# Patient Record
Sex: FEMALE | Race: WHITE | HISPANIC OR LATINO | Employment: UNEMPLOYED | URBAN - METROPOLITAN AREA
[De-identification: names, ages, dates, MRNs, and addresses within clinical notes are randomized per-mention and may not be internally consistent; named-entity substitution may affect disease eponyms.]

---

## 2024-04-15 ENCOUNTER — OFFICE VISIT (OUTPATIENT)
Dept: FAMILY MEDICINE CLINIC | Facility: CLINIC | Age: 39
End: 2024-04-15
Payer: COMMERCIAL

## 2024-04-15 VITALS
DIASTOLIC BLOOD PRESSURE: 82 MMHG | TEMPERATURE: 98 F | WEIGHT: 217 LBS | BODY MASS INDEX: 40.97 KG/M2 | OXYGEN SATURATION: 98 % | HEART RATE: 83 BPM | HEIGHT: 61 IN | SYSTOLIC BLOOD PRESSURE: 122 MMHG

## 2024-04-15 DIAGNOSIS — R63.5 WEIGHT GAIN: ICD-10-CM

## 2024-04-15 DIAGNOSIS — R79.89 ELEVATED TSH: ICD-10-CM

## 2024-04-15 DIAGNOSIS — I10 PRIMARY HYPERTENSION: Primary | ICD-10-CM

## 2024-04-15 DIAGNOSIS — L65.9 HAIR LOSS: ICD-10-CM

## 2024-04-15 DIAGNOSIS — Z01.30 BLOOD PRESSURE CHECK: ICD-10-CM

## 2024-04-15 PROCEDURE — 3079F DIAST BP 80-89 MM HG: CPT

## 2024-04-15 PROCEDURE — 3074F SYST BP LT 130 MM HG: CPT

## 2024-04-15 PROCEDURE — 1159F MED LIST DOCD IN RCRD: CPT

## 2024-04-15 PROCEDURE — 99213 OFFICE O/P EST LOW 20 MIN: CPT

## 2024-04-15 PROCEDURE — 1160F RVW MEDS BY RX/DR IN RCRD: CPT

## 2024-04-15 NOTE — ASSESSMENT & PLAN NOTE
Hypertension is stable and controlled  Continue current treatment regimen.  Dietary sodium restriction.  Weight loss.  Regular aerobic exercise.  Ambulatory blood pressure monitoring.  Blood pressure will be reassessed  at next scheduled appointment .

## 2024-04-15 NOTE — PROGRESS NOTES
"Chief Complaint  Hypertension (Pt is here today for follow up on hypertension,)    Subjective        Marielena Couch presents to [facility]    History of Present Illness  38 year old female presents for hypertension. She is currently on Losartan 100mg and HCTZ 25mg daily. BP is well controlled in clinic today at 122/82. TSH 10.700 with normal T4 on prior lab testing. Reports weight gain and hair loss. Denies fatigue and mood changes. Will reassess today. Denies chest pain and shortness of breath.   Hypertension      Objective   Vital Signs:  /82   Pulse 83   Temp 98 °F (36.7 °C)   Ht 155 cm (61.02\")   Wt 98.4 kg (217 lb)   SpO2 98%   BMI 40.97 kg/m²   Estimated body mass index is 40.97 kg/m² as calculated from the following:    Height as of this encounter: 155 cm (61.02\").    Weight as of this encounter: 98.4 kg (217 lb).          Physical Exam  Constitutional:       Appearance: Normal appearance.   HENT:      Head: Normocephalic.   Eyes:      Conjunctiva/sclera: Conjunctivae normal.      Pupils: Pupils are equal, round, and reactive to light.   Cardiovascular:      Rate and Rhythm: Normal rate and regular rhythm.      Pulses: Normal pulses.      Heart sounds: Normal heart sounds.   Pulmonary:      Effort: Pulmonary effort is normal.      Breath sounds: Normal breath sounds.   Skin:     General: Skin is warm.   Neurological:      General: No focal deficit present.      Mental Status: She is alert and oriented to person, place, and time.   Psychiatric:         Mood and Affect: Mood normal.         Behavior: Behavior normal.            Result Review :    The following data was reviewed by: JULIANA Mgcee on 04/15/2024:  Common labs          4/1/2024    14:27   Common Labs   Glucose 89    BUN 8    Creatinine 0.69    Sodium 138    Potassium 4.4    Chloride 101    Calcium 10.0    Total Protein 8.2    Albumin 4.3    Total Bilirubin 0.3    Alkaline " Phosphatase 96    AST (SGOT) 23    ALT (SGPT) 26    WBC 10.3    Hemoglobin 14.9    Hematocrit 44.3    Platelets 411    Total Cholesterol 200    Triglycerides 273    HDL Cholesterol 28    LDL Cholesterol  123    Hemoglobin A1C 6.3    Uric Acid 4.8      Data reviewed : labs              Assessment and Plan     Diagnoses and all orders for this visit:    1. Primary hypertension (Primary)  Assessment & Plan:  Hypertension is stable and controlled  Continue current treatment regimen.  Dietary sodium restriction.  Weight loss.  Regular aerobic exercise.  Ambulatory blood pressure monitoring.  Blood pressure will be reassessed  at next scheduled appointment .      2. Blood pressure check  Comments:  Continue current BP medication and lifestyle regimen  Increase daily exercise, limit sodium and fried, fatty food intake    3. Elevated TSH  -     Thyroid Panel With TSH    4. Weight gain  -     Thyroid Panel With TSH    5. Hair loss  -     Thyroid Panel With TSH             Follow Up     Return in about 6 months (around 10/15/2024) for Annual physical.  Patient was given instructions and counseling regarding her condition or for health maintenance advice. Please see specific information pulled into the AVS if appropriate.

## 2024-04-16 LAB
FT4I SERPL CALC-MCNC: 1.4 (ref 1.2–4.9)
T3RU NFR SERPL: 28 % (ref 24–39)
T4 SERPL-MCNC: 5.1 UG/DL (ref 4.5–12)
TSH SERPL DL<=0.005 MIU/L-ACNC: 12 UIU/ML (ref 0.45–4.5)

## 2024-04-22 DIAGNOSIS — E03.8 SUBCLINICAL HYPOTHYROIDISM: Primary | ICD-10-CM

## 2024-04-22 RX ORDER — LEVOTHYROXINE SODIUM 0.03 MG/1
25 TABLET ORAL
Qty: 30 TABLET | Refills: 0 | Status: SHIPPED | OUTPATIENT
Start: 2024-04-22

## 2024-05-20 DIAGNOSIS — E03.8 SUBCLINICAL HYPOTHYROIDISM: ICD-10-CM

## 2024-05-21 RX ORDER — LEVOTHYROXINE SODIUM 0.03 MG/1
25 TABLET ORAL
Qty: 30 TABLET | Refills: 0 | Status: SHIPPED | OUTPATIENT
Start: 2024-05-21

## 2024-05-23 ENCOUNTER — OFFICE VISIT (OUTPATIENT)
Dept: FAMILY MEDICINE CLINIC | Facility: CLINIC | Age: 39
End: 2024-05-23
Payer: COMMERCIAL

## 2024-05-23 VITALS
SYSTOLIC BLOOD PRESSURE: 120 MMHG | BODY MASS INDEX: 41.54 KG/M2 | WEIGHT: 220 LBS | TEMPERATURE: 98 F | HEART RATE: 85 BPM | HEIGHT: 61 IN | DIASTOLIC BLOOD PRESSURE: 80 MMHG | OXYGEN SATURATION: 100 %

## 2024-05-23 DIAGNOSIS — E03.8 SUBCLINICAL HYPOTHYROIDISM: Primary | ICD-10-CM

## 2024-05-23 DIAGNOSIS — J00 ACUTE NASOPHARYNGITIS: ICD-10-CM

## 2024-05-23 DIAGNOSIS — E78.2 MIXED HYPERLIPIDEMIA: ICD-10-CM

## 2024-05-23 DIAGNOSIS — I10 PRIMARY HYPERTENSION: ICD-10-CM

## 2024-05-23 PROCEDURE — 1160F RVW MEDS BY RX/DR IN RCRD: CPT

## 2024-05-23 PROCEDURE — 3074F SYST BP LT 130 MM HG: CPT

## 2024-05-23 PROCEDURE — 99214 OFFICE O/P EST MOD 30 MIN: CPT

## 2024-05-23 PROCEDURE — 1159F MED LIST DOCD IN RCRD: CPT

## 2024-05-23 PROCEDURE — 1126F AMNT PAIN NOTED NONE PRSNT: CPT

## 2024-05-23 PROCEDURE — 3079F DIAST BP 80-89 MM HG: CPT

## 2024-05-23 RX ORDER — LORATADINE 10 MG/1
10 TABLET ORAL DAILY
Qty: 90 TABLET | Refills: 0 | Status: SHIPPED | OUTPATIENT
Start: 2024-05-23

## 2024-05-23 RX ORDER — AZITHROMYCIN 250 MG/1
TABLET, FILM COATED ORAL
Qty: 6 TABLET | Refills: 0 | Status: SHIPPED | OUTPATIENT
Start: 2024-05-23

## 2024-05-23 NOTE — PROGRESS NOTES
"Chief Complaint  Thyroid Problem (Pt is here today to follow up on thyroid.)    Subjective        Marielena Couch presents to [facility]    History of Present Illness  38 year old female presents for hypothyroidism. Interpretor used during exam. She is currently on levothyroxine 25 mcg daily. TSH 12.000 with normal T4 on 4/15. Hypertension is well controlled at 120/80. Continue daily Losartan and HCTZ. Denies chest pain and shortness of breath. She reports nasal congestion, rhinorrhea, and cough that is intermittent. Discussed most recent lipid panel results. Pt interested in ways to lower levels. Educated to follow healthy diet high in lean proteins such as chicken and fish. Have diet high in whole grains, fiber, fruits, and vegetables. Limit fried foods, red meats, and sugar. Pt should exercise 5 days/week.      Objective   Vital Signs:  /80   Pulse 85   Temp 98 °F (36.7 °C)   Ht 155 cm (61.02\")   Wt 99.8 kg (220 lb)   SpO2 100%   BMI 41.54 kg/m²   Estimated body mass index is 41.54 kg/m² as calculated from the following:    Height as of this encounter: 155 cm (61.02\").    Weight as of this encounter: 99.8 kg (220 lb).          Physical Exam  Constitutional:       Appearance: Normal appearance.   HENT:      Head: Normocephalic.   Eyes:      Conjunctiva/sclera: Conjunctivae normal.      Pupils: Pupils are equal, round, and reactive to light.   Cardiovascular:      Rate and Rhythm: Normal rate and regular rhythm.      Pulses: Normal pulses.      Heart sounds: Normal heart sounds.   Pulmonary:      Effort: Pulmonary effort is normal.      Breath sounds: Normal breath sounds.   Skin:     General: Skin is warm.   Neurological:      General: No focal deficit present.      Mental Status: She is alert and oriented to person, place, and time.   Psychiatric:         Mood and Affect: Mood normal.         Behavior: Behavior normal.            Result Review :    The " following data was reviewed by: JULIANA Mcgee on 05/23/2024:  Common labs          4/1/2024    14:27   Common Labs   Glucose 89    BUN 8    Creatinine 0.69    Sodium 138    Potassium 4.4    Chloride 101    Calcium 10.0    Total Protein 8.2    Albumin 4.3    Total Bilirubin 0.3    Alkaline Phosphatase 96    AST (SGOT) 23    ALT (SGPT) 26    WBC 10.3    Hemoglobin 14.9    Hematocrit 44.3    Platelets 411    Total Cholesterol 200    Triglycerides 273    HDL Cholesterol 28    LDL Cholesterol  123    Hemoglobin A1C 6.3    Uric Acid 4.8      Data reviewed : labs              Assessment and Plan     Diagnoses and all orders for this visit:    1. Subclinical hypothyroidism (Primary)  -     Thyroid Panel With TSH    2. Primary hypertension  Assessment & Plan:  Hypertension is stable and controlled  Continue current treatment regimen.  Ambulatory blood pressure monitoring.  Blood pressure will be reassessed  at next scheduled appt .      3. Acute nasopharyngitis  -     azithromycin (Zithromax Z-Frank) 250 MG tablet; Take 2 tablets by mouth on day 1, then 1 tablet daily on days 2-5  Dispense: 6 tablet; Refill: 0  -     loratadine (Claritin) 10 MG tablet; Take 1 tablet by mouth Daily.  Dispense: 90 tablet; Refill: 0    4. Mixed hyperlipidemia  Comments:  Educated to follow diet - limit red meats, fried foods  Increase intake of lean meats such as chicken, turkey, and fish  Exercise 5 days/week             Follow Up     Return in about 6 weeks (around 7/2/2024).  Patient was given instructions and counseling regarding her condition or for health maintenance advice. Please see specific information pulled into the AVS if appropriate.

## 2024-05-23 NOTE — ASSESSMENT & PLAN NOTE
Hypertension is stable and controlled  Continue current treatment regimen.  Ambulatory blood pressure monitoring.  Blood pressure will be reassessed  at next scheduled appt .

## 2024-05-24 LAB
FT4I SERPL CALC-MCNC: 1.5 (ref 1.2–4.9)
T3RU NFR SERPL: 26 % (ref 24–39)
T4 SERPL-MCNC: 5.9 UG/DL (ref 4.5–12)
TSH SERPL DL<=0.005 MIU/L-ACNC: 5.14 UIU/ML (ref 0.45–4.5)

## 2024-05-26 DIAGNOSIS — E03.8 SUBCLINICAL HYPOTHYROIDISM: ICD-10-CM

## 2024-05-28 RX ORDER — LEVOTHYROXINE SODIUM 0.03 MG/1
25 TABLET ORAL
Qty: 30 TABLET | Refills: 3 | Status: SHIPPED | OUTPATIENT
Start: 2024-05-28

## 2024-06-27 ENCOUNTER — OFFICE VISIT (OUTPATIENT)
Dept: OBSTETRICS AND GYNECOLOGY | Facility: CLINIC | Age: 39
End: 2024-06-27
Payer: COMMERCIAL

## 2024-06-27 VITALS — DIASTOLIC BLOOD PRESSURE: 81 MMHG | WEIGHT: 223 LBS | SYSTOLIC BLOOD PRESSURE: 113 MMHG | BODY MASS INDEX: 42.11 KG/M2

## 2024-06-27 DIAGNOSIS — Z01.419 PAP TEST, AS PART OF ROUTINE GYNECOLOGICAL EXAMINATION: ICD-10-CM

## 2024-06-27 DIAGNOSIS — M10.9 GOUT OF HAND, UNSPECIFIED CAUSE, UNSPECIFIED CHRONICITY, UNSPECIFIED LATERALITY: ICD-10-CM

## 2024-06-27 DIAGNOSIS — N91.2 AMENORRHEA: Primary | ICD-10-CM

## 2024-06-27 DIAGNOSIS — I10 PRIMARY HYPERTENSION: ICD-10-CM

## 2024-06-27 DIAGNOSIS — Z00.00 ANNUAL PHYSICAL EXAM: ICD-10-CM

## 2024-06-27 LAB — HCG INTACT+B SERPL-ACNC: <1 MIU/ML

## 2024-06-27 PROCEDURE — 99385 PREV VISIT NEW AGE 18-39: CPT | Performed by: OBSTETRICS & GYNECOLOGY

## 2024-06-27 PROCEDURE — 3074F SYST BP LT 130 MM HG: CPT | Performed by: OBSTETRICS & GYNECOLOGY

## 2024-06-27 PROCEDURE — 99213 OFFICE O/P EST LOW 20 MIN: CPT | Performed by: OBSTETRICS & GYNECOLOGY

## 2024-06-27 PROCEDURE — 1159F MED LIST DOCD IN RCRD: CPT | Performed by: OBSTETRICS & GYNECOLOGY

## 2024-06-27 PROCEDURE — 2014F MENTAL STATUS ASSESS: CPT | Performed by: OBSTETRICS & GYNECOLOGY

## 2024-06-27 PROCEDURE — 1160F RVW MEDS BY RX/DR IN RCRD: CPT | Performed by: OBSTETRICS & GYNECOLOGY

## 2024-06-27 PROCEDURE — 3079F DIAST BP 80-89 MM HG: CPT | Performed by: OBSTETRICS & GYNECOLOGY

## 2024-06-27 RX ORDER — ALLOPURINOL 100 MG/1
100 TABLET ORAL DAILY
Qty: 90 TABLET | Refills: 1 | Status: SHIPPED | OUTPATIENT
Start: 2024-06-27

## 2024-06-27 RX ORDER — HYDROCHLOROTHIAZIDE 25 MG/1
25 TABLET ORAL DAILY
Qty: 90 TABLET | Refills: 1 | Status: SHIPPED | OUTPATIENT
Start: 2024-06-27

## 2024-06-27 RX ORDER — LOSARTAN POTASSIUM 100 MG/1
100 TABLET ORAL DAILY
Qty: 90 TABLET | Refills: 1 | Status: SHIPPED | OUTPATIENT
Start: 2024-06-27

## 2024-06-27 NOTE — PROGRESS NOTES
[facility]   Marielena Couch  is a 38 y.o. female who presents for 2 reasons.  First, she would like to establish care and have a routine gynecologic exam and Pap smear.  Overall, she is feeling well.  Bowels and bladder are functioning normally.  The patient has a copper IUD which was placed in [place] more than 10 years ago.  This is her form of contraception.  Next, the patient has been amenorrheic for the last year.  She does not have hot flashes or night sweats.  Does not feel that she is likely to be pregnant.  Does not have headaches or vision changes.  She reports that her cycles have always been irregular but they usually occur every 3 to 4 months until this year.  She has been treated by her primary physician for hypothyroidism and her most recent TSH remains elevated.  The patient would like to evaluate her amenorrhea today as well.    Chief Complaint   Patient presents with    Annual Exam     New gyno   Over a year since cycle        Past Medical History:   Diagnosis Date    Hypertension        Past Surgical History:   Procedure Laterality Date    BREAST SURGERY      cosmetics       Social History     Socioeconomic History    Marital status: Single   Tobacco Use    Smoking status: Never    Smokeless tobacco: Never   Vaping Use    Vaping status: Never Used   Substance and Sexual Activity    Alcohol use: Yes     Comment: ocass    Drug use: Never       The following portions of the patient's history were reviewed and updated as appropriate: allergies, current medications, past family history, past medical history, past social history, past surgical history and problem list.    Review of Systems  This is positive for secondary amenorrhea.  It is negative for headaches or vision changes.  Negative for unexplained weight change.  Negative for fever or chills.  All other systems are reviewed and are negative.        Physical Exam  Vitals and nursing note reviewed.   Constitutional:       Appearance: She  is well-developed.   HENT:      Head: Normocephalic and atraumatic.   Cardiovascular:      Rate and Rhythm: Normal rate and regular rhythm.   Pulmonary:      Effort: Pulmonary effort is normal.      Breath sounds: Normal breath sounds. No wheezing or rales.   Chest:      Comments: Breast reduction incision sites are well-approximated.  There are no palpable breast lumps.  Nipple discharge and axillary adenopathy are absent.  Abdominal:      General: There is no distension.      Palpations: Abdomen is soft.      Tenderness: There is no abdominal tenderness.   Genitourinary:     Labia:         Right: No lesion.         Left: No lesion.       Vagina: Normal. No vaginal discharge.      Cervix: No cervical motion tenderness.      Adnexa:         Right: No mass or tenderness.          Left: No mass or tenderness.        Comments: IUD string is visible at the cervix.  It is a white string.  The uterus is mobile within the pelvis.  It is normal in size.  It is not tender to palpation.  Skin:     General: Skin is warm and dry.   Neurological:      Mental Status: She is alert and oriented to person, place, and time.         Assessment    Diagnoses and all orders for this visit:    1. Amenorrhea (Primary)  -     US Non-ob Transvaginal; Future  -     Follicle Stimulating Hormone  -     HCG, B-subunit, Quantitative    2. Pap test, as part of routine gynecological examination  -     IGP, Rfx Aptima HPV ASCU    3. Annual physical exam        Plan  Annual examination performed  Contraceptive counseling.  The patient reports that she has a copper IUD in place.  It was placed in [place] more than 10 years ago.  She is not certain how long her IUD can remain in place.  I counseled the patient that a copper IUD is indicated in [place] for 10 years and that we should consider either replacing the IUD or an alternative method of contraception.  I recommend that we defer this decision until the remainder of the patient's workup for  amenorrhea has been completed and she agrees.  Secondary amenorrhea.  We discussed possible causes of this.  The patient does not have hot flashes or night sweats, but early menopause is certainly a possibility.  I recommend a follicle-stimulating hormone.  Though the patient has an IUD in place, I also recommend an hCG and the patient agrees.  She does not have pregnancy related symptoms.  There is a high likelihood that the patient's hypothyroidism is the cause of her abnormal cycles.  She is working with her primary care physician to treat this and has an appointment in July for further management.  Nonetheless, there is a concern for endometrial hyperplasia or neoplasia.  I recommend an ultrasound to assess the endometrium and the adnexa.  Also, I recommend a Provera withdrawal.  The patient will follow-up in approximately 3 weeks to review the results of her blood tests and ultrasound.  We can then discuss further management of her amenorrhea as well as contraception.  Answered the patient's questions and she agrees with these recommendations.    Return in about 3 weeks (around 7/18/2024).    Social History     Tobacco Use   Smoking Status Never   Smokeless Tobacco Never

## 2024-06-28 DIAGNOSIS — M10.9 GOUT OF HAND, UNSPECIFIED CAUSE, UNSPECIFIED CHRONICITY, UNSPECIFIED LATERALITY: ICD-10-CM

## 2024-06-28 DIAGNOSIS — I10 PRIMARY HYPERTENSION: ICD-10-CM

## 2024-06-28 LAB — FSH SERPL-ACNC: 5.3 MIU/ML

## 2024-06-28 RX ORDER — LOSARTAN POTASSIUM 100 MG/1
100 TABLET ORAL DAILY
Qty: 90 TABLET | Refills: 1 | OUTPATIENT
Start: 2024-06-28

## 2024-06-28 RX ORDER — HYDROCHLOROTHIAZIDE 25 MG/1
25 TABLET ORAL DAILY
Qty: 90 TABLET | Refills: 1 | OUTPATIENT
Start: 2024-06-28

## 2024-06-28 RX ORDER — ALLOPURINOL 100 MG/1
100 TABLET ORAL DAILY
Qty: 90 TABLET | Refills: 1 | OUTPATIENT
Start: 2024-06-28

## 2024-07-01 LAB
CONV .: NORMAL
CYTOLOGIST CVX/VAG CYTO: NORMAL
CYTOLOGY CVX/VAG DOC CYTO: NORMAL
CYTOLOGY CVX/VAG DOC THIN PREP: NORMAL
DX ICD CODE: NORMAL
Lab: NORMAL
OTHER STN SPEC: NORMAL
STAT OF ADQ CVX/VAG CYTO-IMP: NORMAL

## 2024-07-02 ENCOUNTER — TELEPHONE (OUTPATIENT)
Dept: OBSTETRICS AND GYNECOLOGY | Facility: CLINIC | Age: 39
End: 2024-07-02
Payer: COMMERCIAL

## 2024-07-02 NOTE — TELEPHONE ENCOUNTER
Dr. Figueroa,    Patient is scheduled and is now asking if you were still planning on sending in the hormone medication that was discussed during her visit on 6/27?      Sheri

## 2024-07-02 NOTE — TELEPHONE ENCOUNTER
Dr. Figueroa,    Patient is calling and stating she was supposed to have an US by today 7/2. I see a US ordered but the office note states to return in 3 weeks. And the patient was just seen 6/27/24. Is that when she is supposed to complete the US is the 3 week f/u?    Please advise  Thanks Sheri

## 2024-07-02 NOTE — TELEPHONE ENCOUNTER
The main issue was to do the ultrasound after her lab results had come back.  It could be done as part of a follow-up visit next week or the week after but it is not necessary to do it today.  Thank you.

## 2024-07-08 ENCOUNTER — OFFICE VISIT (OUTPATIENT)
Dept: FAMILY MEDICINE CLINIC | Facility: CLINIC | Age: 39
End: 2024-07-08
Payer: COMMERCIAL

## 2024-07-08 VITALS
WEIGHT: 226 LBS | HEART RATE: 93 BPM | SYSTOLIC BLOOD PRESSURE: 120 MMHG | OXYGEN SATURATION: 97 % | BODY MASS INDEX: 42.67 KG/M2 | TEMPERATURE: 98 F | DIASTOLIC BLOOD PRESSURE: 80 MMHG | HEIGHT: 61 IN

## 2024-07-08 DIAGNOSIS — Z00.00 ROUTINE HEALTH MAINTENANCE: Primary | ICD-10-CM

## 2024-07-08 DIAGNOSIS — E03.8 SUBCLINICAL HYPOTHYROIDISM: ICD-10-CM

## 2024-07-08 DIAGNOSIS — Z76.89 ENCOUNTER FOR WEIGHT MANAGEMENT: ICD-10-CM

## 2024-07-08 DIAGNOSIS — R73.03 PREDIABETES: ICD-10-CM

## 2024-07-08 DIAGNOSIS — E78.2 MIXED HYPERLIPIDEMIA: ICD-10-CM

## 2024-07-08 DIAGNOSIS — I10 PRIMARY HYPERTENSION: ICD-10-CM

## 2024-07-08 PROCEDURE — 99214 OFFICE O/P EST MOD 30 MIN: CPT

## 2024-07-08 PROCEDURE — 1159F MED LIST DOCD IN RCRD: CPT

## 2024-07-08 PROCEDURE — 1126F AMNT PAIN NOTED NONE PRSNT: CPT

## 2024-07-08 PROCEDURE — 3079F DIAST BP 80-89 MM HG: CPT

## 2024-07-08 PROCEDURE — 1160F RVW MEDS BY RX/DR IN RCRD: CPT

## 2024-07-08 PROCEDURE — 3074F SYST BP LT 130 MM HG: CPT

## 2024-07-08 NOTE — PROGRESS NOTES
"Chief Complaint  Thyroid Problem (Pt is here today to follow up on thyroid no other cc.)    Subjective        Marielena Couch presents to [facility]    History of Present Illness  38 year old female presents for 6 month follow up. Interpretor used during exam. She is currently on levothyroxine 25mcg for hypothyroidism. Hypertension is well controlled at 120/80 with losartan and hctz. Labs drawn 4/1/24 show mixed hyperlipidemia and prediabetes. Will recheck labs today. She is inquiring about weight loss medication. Has BMI of 42.67. She reports she does not follow healthy diet or exercise. Pt should follow healthy diet high in lean proteins such as chicken and fish. Have diet high in whole grains, fiber, fruits, and vegetables. Limit fried foods, red meats, and sugar. Pt should exercise 3-4 days/week. Informed patient that if healthy habits are not established, it will be difficult to sustain weight loss once medication has been discontinued. Healthy diet information attached to AVS. Denies chest pain and shortness of breath.       Objective   Vital Signs:  /80   Pulse 93   Temp 98 °F (36.7 °C)   Ht 155 cm (61.02\")   Wt 103 kg (226 lb)   SpO2 97%   BMI 42.67 kg/m²   Estimated body mass index is 42.67 kg/m² as calculated from the following:    Height as of this encounter: 155 cm (61.02\").    Weight as of this encounter: 103 kg (226 lb).           Physical Exam  Constitutional:       Appearance: Normal appearance.   HENT:      Head: Normocephalic.   Eyes:      Conjunctiva/sclera: Conjunctivae normal.      Pupils: Pupils are equal, round, and reactive to light.   Cardiovascular:      Rate and Rhythm: Normal rate and regular rhythm.      Pulses: Normal pulses.      Heart sounds: Normal heart sounds.   Pulmonary:      Effort: Pulmonary effort is normal.      Breath sounds: Normal breath sounds.   Skin:     General: Skin is warm.   Neurological:      General: No " focal deficit present.      Mental Status: She is alert and oriented to person, place, and time.   Psychiatric:         Mood and Affect: Mood normal.         Behavior: Behavior normal.          Result Review :    The following data was reviewed by: JULIANA Mcgee on 07/08/2024:  Common labs          4/1/2024    14:27   Common Labs   Glucose 89    BUN 8    Creatinine 0.69    Sodium 138    Potassium 4.4    Chloride 101    Calcium 10.0    Total Protein 8.2    Albumin 4.3    Total Bilirubin 0.3    Alkaline Phosphatase 96    AST (SGOT) 23    ALT (SGPT) 26    WBC 10.3    Hemoglobin 14.9    Hematocrit 44.3    Platelets 411    Total Cholesterol 200    Triglycerides 273    HDL Cholesterol 28    LDL Cholesterol  123    Hemoglobin A1C 6.3    Uric Acid 4.8      Data reviewed : labs              Assessment and Plan     Diagnoses and all orders for this visit:    1. Routine health maintenance (Primary)    2. Primary hypertension  Assessment & Plan:  Hypertension is stable and controlled  Continue current treatment regimen.  Dietary sodium restriction.  Weight loss.  Regular aerobic exercise.  Ambulatory blood pressure monitoring.  Blood pressure will be reassessed  at next scheduled appt .    Orders:  -     Comprehensive metabolic panel    3. Subclinical hypothyroidism  -     Thyroid Panel With TSH    4. Prediabetes  -     Comprehensive metabolic panel  -     Hemoglobin A1c    5. Mixed hyperlipidemia  -     Comprehensive metabolic panel  -     Lipid panel    6. Encounter for weight management  -     Comprehensive metabolic panel  -     Thyroid Panel With TSH  -     Hemoglobin A1c    7. Body mass index (BMI) of 40.0 to 44.9 in adult  -     Comprehensive metabolic panel  -     Thyroid Panel With TSH  -     Hemoglobin A1c             Follow Up     No follow-ups on file.  Patient was given instructions and counseling regarding her condition or for health maintenance advice. Please see specific information pulled into the AVS if  appropriate.

## 2024-07-08 NOTE — ASSESSMENT & PLAN NOTE
Hypertension is stable and controlled  Continue current treatment regimen.  Dietary sodium restriction.  Weight loss.  Regular aerobic exercise.  Ambulatory blood pressure monitoring.  Blood pressure will be reassessed  at next scheduled appt .

## 2024-07-09 ENCOUNTER — PATIENT MESSAGE (OUTPATIENT)
Dept: OBSTETRICS AND GYNECOLOGY | Facility: CLINIC | Age: 39
End: 2024-07-09
Payer: COMMERCIAL

## 2024-07-09 ENCOUNTER — PATIENT ROUNDING (BHMG ONLY) (OUTPATIENT)
Dept: OBSTETRICS AND GYNECOLOGY | Facility: CLINIC | Age: 39
End: 2024-07-09
Payer: COMMERCIAL

## 2024-07-09 LAB
ALBUMIN SERPL-MCNC: 4.4 G/DL (ref 3.9–4.9)
ALP SERPL-CCNC: 101 IU/L (ref 44–121)
ALT SERPL-CCNC: 39 IU/L (ref 0–32)
AST SERPL-CCNC: 27 IU/L (ref 0–40)
BILIRUB SERPL-MCNC: 0.4 MG/DL (ref 0–1.2)
BUN SERPL-MCNC: 15 MG/DL (ref 6–20)
BUN/CREAT SERPL: 24 (ref 9–23)
CALCIUM SERPL-MCNC: 10.1 MG/DL (ref 8.7–10.2)
CHLORIDE SERPL-SCNC: 95 MMOL/L (ref 96–106)
CHOLEST SERPL-MCNC: 188 MG/DL (ref 100–199)
CO2 SERPL-SCNC: 26 MMOL/L (ref 20–29)
CREAT SERPL-MCNC: 0.63 MG/DL (ref 0.57–1)
EGFRCR SERPLBLD CKD-EPI 2021: 116 ML/MIN/1.73
FT4I SERPL CALC-MCNC: 1.8 (ref 1.2–4.9)
GLOBULIN SER CALC-MCNC: 4 G/DL (ref 1.5–4.5)
GLUCOSE SERPL-MCNC: 137 MG/DL (ref 70–99)
HBA1C MFR BLD: 6.5 % (ref 4.8–5.6)
HDLC SERPL-MCNC: 30 MG/DL
LDLC SERPL CALC-MCNC: 92 MG/DL (ref 0–99)
POTASSIUM SERPL-SCNC: 4.3 MMOL/L (ref 3.5–5.2)
PROT SERPL-MCNC: 8.4 G/DL (ref 6–8.5)
SODIUM SERPL-SCNC: 136 MMOL/L (ref 134–144)
T3RU NFR SERPL: 26 % (ref 24–39)
T4 SERPL-MCNC: 6.9 UG/DL (ref 4.5–12)
TRIGL SERPL-MCNC: 395 MG/DL (ref 0–149)
TSH SERPL DL<=0.005 MIU/L-ACNC: 5.15 UIU/ML (ref 0.45–4.5)
VLDLC SERPL CALC-MCNC: 66 MG/DL (ref 5–40)

## 2024-07-09 NOTE — PROGRESS NOTES
My chart message has been sent to the patient for PATIENT ROUNDING with [facility].

## 2024-07-11 DIAGNOSIS — E03.8 SUBCLINICAL HYPOTHYROIDISM: ICD-10-CM

## 2024-07-25 ENCOUNTER — OFFICE VISIT (OUTPATIENT)
Dept: OBSTETRICS AND GYNECOLOGY | Facility: CLINIC | Age: 39
End: 2024-07-25
Payer: COMMERCIAL

## 2024-07-25 VITALS — WEIGHT: 221 LBS | BODY MASS INDEX: 41.73 KG/M2 | DIASTOLIC BLOOD PRESSURE: 82 MMHG | SYSTOLIC BLOOD PRESSURE: 122 MMHG

## 2024-07-25 DIAGNOSIS — Z30.09 BIRTH CONTROL COUNSELING: ICD-10-CM

## 2024-07-25 DIAGNOSIS — N91.2 AMENORRHEA: Primary | ICD-10-CM

## 2024-07-25 RX ORDER — MEDROXYPROGESTERONE ACETATE 10 MG/1
10 TABLET ORAL DAILY
Qty: 10 TABLET | Refills: 0 | Status: SHIPPED | OUTPATIENT
Start: 2024-07-25 | End: 2024-08-04

## 2024-07-25 NOTE — PROGRESS NOTES
[facility]   Marielena Couch  is a 38 y.o. female who presents for 2 reasons.  First, she like to discuss secondary amenorrhea.  She had a workup including thyroid panel, pregnancy test and follicle-stimulating hormone.  Also, an ultrasound was performed.  The patient did not receive her Provera withdrawal that was ordered so this has not yet been done.  She has been evaluated by her primary care physician and is in the middle of a workup for her thyroid.  Next, the patient has an IUD that was placed in [place].  She would like to discuss removing it and having a new IUD placed.    Chief Complaint   Patient presents with    Follow-up       Past Medical History:   Diagnosis Date    Hypertension        Past Surgical History:   Procedure Laterality Date    BREAST SURGERY      cosmetics       Social History     Socioeconomic History    Marital status: Single   Tobacco Use    Smoking status: Never    Smokeless tobacco: Never   Vaping Use    Vaping status: Never Used   Substance and Sexual Activity    Alcohol use: Yes     Comment: ocass    Drug use: Never       The following portions of the patient's history were reviewed and updated as appropriate: allergies, current medications, past family history, past medical history, past social history, past surgical history and problem list.    Review of Systems     This is positive for amenorrhea.  Negative for hot flashes and night sweats.  Negative for unexplained weight change.    Physical Exam  Vitals and nursing note reviewed.   Constitutional:       Appearance: Normal appearance.   Neurological:      Mental Status: She is alert and oriented to person, place, and time.         Assessment    Diagnoses and all orders for this visit:    1. Amenorrhea (Primary)    2. Birth control counseling    Other orders  -     medroxyPROGESTERone (Provera) 10 MG tablet; Take 1 tablet by mouth Daily for 10 days.  Dispense: 10 tablet; Refill: 0        Plan  Secondary amenorrhea.  I  counseled the patient and answered her questions.  She is not menopausal by laboratory evaluation and as expected, she is not pregnant.  Most likely, she is anovulatory due to her hypothyroidism.  I expect this to improve as her thyroid workup is completed and treatment is instituted.  In the interim, I recommend a Provera withdrawal to stimulate a menstrual cycle.  The patient agrees with this.  Provera has been reordered.  Counseled regarding the patient's form of contraception.  She would like to have her IUD removed.  It is more than 10 years old.  She would like to have a ParaGard placed.  We discussed this procedure as well as its benefits, risks and alternatives.  I answered the patient's questions and she would like to proceed.  She will schedule this at checkout today.    No follow-ups on file.    Social History     Tobacco Use   Smoking Status Never   Smokeless Tobacco Never

## 2024-08-01 RX ORDER — MEDROXYPROGESTERONE ACETATE 10 MG/1
10 TABLET ORAL DAILY
Qty: 10 TABLET | Refills: 0 | OUTPATIENT
Start: 2024-08-01 | End: 2024-08-11

## 2024-08-21 DIAGNOSIS — E03.8 SUBCLINICAL HYPOTHYROIDISM: ICD-10-CM

## 2024-08-21 RX ORDER — LEVOTHYROXINE SODIUM 0.03 MG/1
25 TABLET ORAL
Qty: 30 TABLET | Refills: 3 | Status: SHIPPED | OUTPATIENT
Start: 2024-08-21

## 2024-09-13 ENCOUNTER — OFFICE VISIT (OUTPATIENT)
Dept: FAMILY MEDICINE CLINIC | Facility: CLINIC | Age: 39
End: 2024-09-13
Payer: COMMERCIAL

## 2024-09-13 VITALS
HEART RATE: 77 BPM | OXYGEN SATURATION: 97 % | BODY MASS INDEX: 41.72 KG/M2 | DIASTOLIC BLOOD PRESSURE: 80 MMHG | SYSTOLIC BLOOD PRESSURE: 114 MMHG | WEIGHT: 221 LBS | TEMPERATURE: 98 F | HEIGHT: 61 IN

## 2024-09-13 DIAGNOSIS — E03.8 SUBCLINICAL HYPOTHYROIDISM: ICD-10-CM

## 2024-09-13 DIAGNOSIS — Z11.1 SCREENING FOR TUBERCULOSIS: ICD-10-CM

## 2024-09-13 DIAGNOSIS — I10 PRIMARY HYPERTENSION: ICD-10-CM

## 2024-09-13 DIAGNOSIS — Z02.89 ENCOUNTER FOR PHYSICAL EXAMINATION RELATED TO EMPLOYMENT: Primary | ICD-10-CM

## 2024-09-13 PROCEDURE — 3074F SYST BP LT 130 MM HG: CPT

## 2024-09-13 PROCEDURE — 1126F AMNT PAIN NOTED NONE PRSNT: CPT

## 2024-09-13 PROCEDURE — 99214 OFFICE O/P EST MOD 30 MIN: CPT

## 2024-09-13 PROCEDURE — 1159F MED LIST DOCD IN RCRD: CPT

## 2024-09-13 PROCEDURE — 1160F RVW MEDS BY RX/DR IN RCRD: CPT

## 2024-09-13 PROCEDURE — 3079F DIAST BP 80-89 MM HG: CPT

## 2024-09-13 NOTE — PROGRESS NOTES
"Chief Complaint  Hypertension (Need form filled out for [employer] /Had physical in July/)    Subjective        Marielena Couch presents to [facility]  History of Present Illness  38 year old female presents for [facility] employment paperwork. Hypertension is well controlled at 114/80. Continue daily regimen of losartan and hctz. Monitor home BP and report continuous readings of 140/90 or higher. Follow heart healthy diet limiting sodium, red meat, and fried, fatty foods. Denies chest pain and shortness of breath. Will screen for TB for compliance. She is currently on levothyroxine 25mcg. Will assess thyroid panel today due to previous abnormal result. [place] [employer] Medical Examination of [employer] Employees returned back to patient. Will upload copy to chart.     Intreprtor used during exam, provided by [facility] [place].       Objective   Vital Signs:  /80 (BP Location: Left arm, Patient Position: Sitting, Cuff Size: Adult)   Pulse 77   Temp 98 °F (36.7 °C)   Ht 155 cm (61.02\")   Wt 100 kg (221 lb)   SpO2 97%   BMI 41.72 kg/m²   Estimated body mass index is 41.72 kg/m² as calculated from the following:    Height as of this encounter: 155 cm (61.02\").    Weight as of this encounter: 100 kg (221 lb).            Physical Exam  Constitutional:       Appearance: Normal appearance.   HENT:      Head: Normocephalic.   Eyes:      Conjunctiva/sclera: Conjunctivae normal.      Pupils: Pupils are equal, round, and reactive to light.   Cardiovascular:      Rate and Rhythm: Normal rate and regular rhythm.      Pulses: Normal pulses.      Heart sounds: Normal heart sounds.   Pulmonary:      Effort: Pulmonary effort is normal.      Breath sounds: Normal breath sounds.   Skin:     General: Skin is warm.   Neurological:      General: No focal deficit present.      Mental Status: She is alert and oriented to person, place, and time.   Psychiatric:         Mood and Affect: Mood " normal.         Behavior: Behavior normal.            Result Review :  The following data was reviewed by: JULIANA Mcgee on 09/13/2024:  Common labs          4/1/2024    14:27 7/8/2024    13:54   Common Labs   Glucose 89  137    BUN 8  15    Creatinine 0.69  0.63    Sodium 138  136    Potassium 4.4  4.3    Chloride 101  95    Calcium 10.0  10.1    Total Protein 8.2  8.4    Albumin 4.3  4.4    Total Bilirubin 0.3  0.4    Alkaline Phosphatase 96  101    AST (SGOT) 23  27    ALT (SGPT) 26  39    WBC 10.3     Hemoglobin 14.9     Hematocrit 44.3     Platelets 411     Total Cholesterol 200  188    Triglycerides 273  395    HDL Cholesterol 28  30    LDL Cholesterol  123  92    Hemoglobin A1C 6.3  6.5    Uric Acid 4.8       Data reviewed : labs            Assessment and Plan   Diagnoses and all orders for this visit:    1. Encounter for physical examination related to employment (Primary)    2. Primary hypertension  Assessment & Plan:  Hypertension is stable and controlled  Continue current treatment regimen.  Dietary sodium restriction.  Weight loss.  Regular aerobic exercise.  Ambulatory blood pressure monitoring.  Blood pressure will be reassessed  at next scheduled appt .      3. Subclinical hypothyroidism  -     Thyroid Panel With TSH    4. Screening for tuberculosis  -     QuantiFERON TB Gold             Follow Up   No follow-ups on file.  Patient was given instructions and counseling regarding her condition or for health maintenance advice. Please see specific information pulled into the AVS if appropriate.

## 2024-09-17 LAB
FT4I SERPL CALC-MCNC: 2.1 (ref 1.2–4.9)
GAMMA INTERFERON BACKGROUND BLD IA-ACNC: 0.04 IU/ML
M TB IFN-G BLD-IMP: NEGATIVE
M TB IFN-G CD4+ BCKGRND COR BLD-ACNC: 0.05 IU/ML
M TB IFN-G CD4+CD8+ BCKGRND COR BLD-ACNC: 0.05 IU/ML
MITOGEN IGNF BCKGRD COR BLD-ACNC: >10 IU/ML
QUANTIFERON INCUBATION: NORMAL
SERVICE CMNT-IMP: NORMAL
T3RU NFR SERPL: 29 % (ref 24–39)
T4 SERPL-MCNC: 7.2 UG/DL (ref 4.5–12)
TSH SERPL DL<=0.005 MIU/L-ACNC: 1.84 UIU/ML (ref 0.45–4.5)

## 2024-09-19 ENCOUNTER — TELEPHONE (OUTPATIENT)
Dept: FAMILY MEDICINE CLINIC | Facility: CLINIC | Age: 39
End: 2024-09-19
Payer: COMMERCIAL

## 2024-12-21 DIAGNOSIS — I10 PRIMARY HYPERTENSION: ICD-10-CM

## 2024-12-21 DIAGNOSIS — M10.9 GOUT OF HAND, UNSPECIFIED CAUSE, UNSPECIFIED CHRONICITY, UNSPECIFIED LATERALITY: ICD-10-CM

## 2024-12-23 RX ORDER — LOSARTAN POTASSIUM 100 MG/1
100 TABLET ORAL DAILY
Qty: 90 TABLET | Refills: 1 | Status: SHIPPED | OUTPATIENT
Start: 2024-12-23

## 2024-12-23 RX ORDER — ALLOPURINOL 100 MG/1
100 TABLET ORAL DAILY
Qty: 90 TABLET | Refills: 1 | Status: SHIPPED | OUTPATIENT
Start: 2024-12-23

## 2024-12-23 RX ORDER — HYDROCHLOROTHIAZIDE 25 MG/1
25 TABLET ORAL DAILY
Qty: 90 TABLET | Refills: 1 | Status: SHIPPED | OUTPATIENT
Start: 2024-12-23

## 2024-12-30 DIAGNOSIS — E03.8 SUBCLINICAL HYPOTHYROIDISM: ICD-10-CM

## 2024-12-30 RX ORDER — LEVOTHYROXINE SODIUM 25 UG/1
25 TABLET ORAL
Qty: 30 TABLET | Refills: 3 | Status: SHIPPED | OUTPATIENT
Start: 2024-12-30

## 2024-12-31 ENCOUNTER — OFFICE VISIT (OUTPATIENT)
Dept: FAMILY MEDICINE CLINIC | Facility: CLINIC | Age: 39
End: 2024-12-31
Payer: COMMERCIAL

## 2024-12-31 VITALS
OXYGEN SATURATION: 97 % | HEART RATE: 85 BPM | SYSTOLIC BLOOD PRESSURE: 126 MMHG | TEMPERATURE: 97.7 F | WEIGHT: 227.2 LBS | HEIGHT: 61 IN | BODY MASS INDEX: 42.9 KG/M2 | DIASTOLIC BLOOD PRESSURE: 84 MMHG

## 2024-12-31 DIAGNOSIS — Z80.3 FAMILY HISTORY OF BREAST CANCER: ICD-10-CM

## 2024-12-31 DIAGNOSIS — J30.2 SEASONAL ALLERGIC RHINITIS, UNSPECIFIED TRIGGER: ICD-10-CM

## 2024-12-31 DIAGNOSIS — Z12.31 ENCOUNTER FOR SCREENING MAMMOGRAM FOR MALIGNANT NEOPLASM OF BREAST: ICD-10-CM

## 2024-12-31 DIAGNOSIS — I10 PRIMARY HYPERTENSION: ICD-10-CM

## 2024-12-31 DIAGNOSIS — E78.2 MIXED HYPERLIPIDEMIA: ICD-10-CM

## 2024-12-31 DIAGNOSIS — R73.03 PREDIABETES: ICD-10-CM

## 2024-12-31 DIAGNOSIS — E03.8 SUBCLINICAL HYPOTHYROIDISM: ICD-10-CM

## 2024-12-31 DIAGNOSIS — Z00.00 ENCOUNTER FOR ANNUAL PHYSICAL EXAM: Primary | ICD-10-CM

## 2024-12-31 PROCEDURE — 3079F DIAST BP 80-89 MM HG: CPT

## 2024-12-31 PROCEDURE — 3074F SYST BP LT 130 MM HG: CPT

## 2024-12-31 PROCEDURE — 1160F RVW MEDS BY RX/DR IN RCRD: CPT

## 2024-12-31 PROCEDURE — 99395 PREV VISIT EST AGE 18-39: CPT

## 2024-12-31 PROCEDURE — 2014F MENTAL STATUS ASSESS: CPT

## 2024-12-31 PROCEDURE — 1126F AMNT PAIN NOTED NONE PRSNT: CPT

## 2024-12-31 PROCEDURE — 1159F MED LIST DOCD IN RCRD: CPT

## 2024-12-31 RX ORDER — FLUTICASONE PROPIONATE 50 MCG
2 SPRAY, SUSPENSION (ML) NASAL DAILY
Qty: 11.1 G | Refills: 1 | Status: SHIPPED | OUTPATIENT
Start: 2024-12-31

## 2024-12-31 RX ORDER — LORATADINE 10 MG/1
10 TABLET ORAL DAILY
Qty: 90 TABLET | Refills: 0 | Status: SHIPPED | OUTPATIENT
Start: 2024-12-31

## 2024-12-31 NOTE — PROGRESS NOTES
Chief Complaint  Annual Exam    Subjective            Marielena Couch presents to [facility]    History of Present Illness  39 year old female presents for annual physical. Pt is currently taking losartan and HCTZ for hypertension that is well controlled at 126/84. Taking levothyroxine 25mcg for hypothyroidism. Previous Hemoglobin A1c at 6.5% indicating diabetes. Lipid panel showed elevated triglyceride levels. Due for screening today. Pt is interested in mammogram to screen for breast cancer. She has family history of breast cancer in maternal aunt. Denies nipple discharge, reports episodes of right breast pain. She reports rhinorrhea with allergy symptoms. Encouraged daily allergy medication and use of flonase as needed. Denies chest pain and shortness of breath. Denies changes in bowel or bladder habits.     Interpretor used during exam, provided by [facility] [place].       CPE- Patient reporting that health is doing well overall.  Patient is here today for annual physical.  Eating a balanced diet.    Labs: Due for routine labs    Colorectal cancer screening: N/A  Breast cancer screening: N/A  Cervical cancer screening: due 6/27/27  Immunization History   Administered Date(s) Administered    Influenza, Unspecified 11/06/2024        Patient has seen dentist within last 6 months  no  Patient has seen eye specialist within last 6 months no    PHQ-2 Depression Screening  Little interest or pleasure in doing things?     Feeling down, depressed, or hopeless?     PHQ-2 Total Score        Social History     Socioeconomic History    Marital status: Single   Tobacco Use    Smoking status: Never    Smokeless tobacco: Never   Vaping Use    Vaping status: Never Used   Substance and Sexual Activity    Alcohol use: Yes     Comment: ocass    Drug use: Never          Objective   Vital Signs:   /84 (BP Location: Left arm, Patient Position: Sitting, Cuff Size: Adult)   Pulse 85   Temp  "97.7 °F (36.5 °C) (Infrared)   Ht 155 cm (61.02\")   Wt 103 kg (227 lb 3.2 oz)   SpO2 97%   BMI 42.90 kg/m²         Physical Exam  Constitutional:       Appearance: Normal appearance.   HENT:      Head: Normocephalic.      Right Ear: Tympanic membrane, ear canal and external ear normal.      Left Ear: Tympanic membrane, ear canal and external ear normal.      Nose: Nose normal. Rhinorrhea present.      Mouth/Throat:      Mouth: Mucous membranes are moist.      Pharynx: Oropharynx is clear.   Eyes:      Conjunctiva/sclera: Conjunctivae normal.      Pupils: Pupils are equal, round, and reactive to light.   Cardiovascular:      Rate and Rhythm: Normal rate and regular rhythm.      Pulses: Normal pulses.      Heart sounds: Normal heart sounds.   Pulmonary:      Effort: Pulmonary effort is normal.      Breath sounds: Normal breath sounds. No wheezing.   Abdominal:      General: Abdomen is flat. Bowel sounds are normal.      Palpations: Abdomen is soft.      Tenderness: There is no abdominal tenderness.   Musculoskeletal:         General: Normal range of motion.      Cervical back: Normal range of motion.   Skin:     General: Skin is warm.   Neurological:      General: No focal deficit present.      Mental Status: She is alert and oriented to person, place, and time.   Psychiatric:         Mood and Affect: Mood normal.         Behavior: Behavior normal.        Result Review :   The following data was reviewed by: JULIANA Mcgee on 12/31/2024:  Common labs          4/1/2024    14:27 7/8/2024    13:54   Common Labs   Glucose 89  137    BUN 8  15    Creatinine 0.69  0.63    Sodium 138  136    Potassium 4.4  4.3    Chloride 101  95    Calcium 10.0  10.1    Total Protein 8.2  8.4    Albumin 4.3  4.4    Total Bilirubin 0.3  0.4    Alkaline Phosphatase 96  101    AST (SGOT) 23  27    ALT (SGPT) 26  39    WBC 10.3     Hemoglobin 14.9     Hematocrit 44.3     Platelets 411     Total Cholesterol 200  188    Triglycerides " 273  395    HDL Cholesterol 28  30    LDL Cholesterol  123  92    Hemoglobin A1C 6.3  6.5    Uric Acid 4.8       Data reviewed : labs             Current Outpatient Medications:     allopurinol (ZYLOPRIM) 100 MG tablet, TAKE 1 TABLET BY MOUTH DAILY, Disp: 90 tablet, Rfl: 1    hydroCHLOROthiazide 25 MG tablet, TAKE 1 TABLET BY MOUTH DAILY, Disp: 90 tablet, Rfl: 1    levothyroxine (SYNTHROID, LEVOTHROID) 25 MCG tablet, TAKE 1 TABLET BY MOUTH EVERY MORNING, Disp: 30 tablet, Rfl: 3    losartan (COZAAR) 100 MG tablet, TAKE 1 TABLET BY MOUTH DAILY, Disp: 90 tablet, Rfl: 1    fluticasone (FLONASE) 50 MCG/ACT nasal spray, Administer 2 sprays into the nostril(s) as directed by provider Daily., Disp: 11.1 g, Rfl: 1    loratadine (Claritin) 10 MG tablet, Take 1 tablet by mouth Daily., Disp: 90 tablet, Rfl: 0          Assessment and Plan    Diagnoses and all orders for this visit:    1. Encounter for annual physical exam (Primary)  -     CBC w AUTO Differential  -     Comprehensive metabolic panel  -     Urinalysis With Culture If Indicated - Urine, Clean Catch    2. Primary hypertension  Assessment & Plan:  Hypertension is stable and controlled  Continue current treatment regimen.  Dietary sodium restriction.  Weight loss.  Regular aerobic exercise.  Ambulatory blood pressure monitoring.  Blood pressure will be reassessed in 6 months.    Orders:  -     CBC w AUTO Differential  -     Comprehensive metabolic panel    3. Mixed hyperlipidemia  -     CBC w AUTO Differential  -     Comprehensive metabolic panel  -     Lipid panel    4. Subclinical hypothyroidism  -     Thyroid Panel With TSH    5. Prediabetes  -     CBC w AUTO Differential  -     Comprehensive metabolic panel  -     Hemoglobin A1c    6. Encounter for screening mammogram for malignant neoplasm of breast  -     Mammo screening digital tomosynthesis bilateral w CAD; Future    7. Family history of breast cancer  -     Mammo screening digital tomosynthesis bilateral w  CAD; Future    8. Seasonal allergic rhinitis, unspecified trigger  -     loratadine (Claritin) 10 MG tablet; Take 1 tablet by mouth Daily.  Dispense: 90 tablet; Refill: 0  -     fluticasone (FLONASE) 50 MCG/ACT nasal spray; Administer 2 sprays into the nostril(s) as directed by provider Daily.  Dispense: 11.1 g; Refill: 1          The patient was counseled regarding nutrition, physical activity, healthy weight, injury prevention, misuse of tobacco, alcohol and illicit drugs, mental health, immunizations, and screenings.      Follow Up   Return in about 6 months (around 6/30/2025) for Next scheduled follow up.  Patient was given instructions and counseling regarding her condition or for health maintenance advice. Please see specific information pulled into the AVS if appropriate.

## 2025-01-01 LAB
ALBUMIN SERPL-MCNC: 4.1 G/DL (ref 3.5–5.2)
ALBUMIN/GLOB SERPL: 1.1 G/DL
ALP SERPL-CCNC: 99 U/L (ref 39–117)
ALT SERPL-CCNC: 29 U/L (ref 1–33)
AST SERPL-CCNC: 21 U/L (ref 1–32)
BASOPHILS # BLD AUTO: 0.04 10*3/MM3 (ref 0–0.2)
BASOPHILS NFR BLD AUTO: 0.5 % (ref 0–1.5)
BILIRUB SERPL-MCNC: 0.5 MG/DL (ref 0–1.2)
BUN SERPL-MCNC: 12 MG/DL (ref 6–20)
BUN/CREAT SERPL: 15.2 (ref 7–25)
CALCIUM SERPL-MCNC: 9.1 MG/DL (ref 8.6–10.5)
CHLORIDE SERPL-SCNC: 101 MMOL/L (ref 98–107)
CHOLEST SERPL-MCNC: 126 MG/DL (ref 0–200)
CO2 SERPL-SCNC: 25.9 MMOL/L (ref 22–29)
CREAT SERPL-MCNC: 0.79 MG/DL (ref 0.57–1)
EGFRCR SERPLBLD CKD-EPI 2021: 97.7 ML/MIN/1.73
EOSINOPHIL # BLD AUTO: 0.15 10*3/MM3 (ref 0–0.4)
EOSINOPHIL NFR BLD AUTO: 1.7 % (ref 0.3–6.2)
ERYTHROCYTE [DISTWIDTH] IN BLOOD BY AUTOMATED COUNT: 13.1 % (ref 12.3–15.4)
FT4I SERPL CALC-MCNC: 1.2 (ref 1.2–4.9)
GLOBULIN SER CALC-MCNC: 3.7 GM/DL
GLUCOSE SERPL-MCNC: 104 MG/DL (ref 65–99)
HBA1C MFR BLD: 6.1 % (ref 4.8–5.6)
HCT VFR BLD AUTO: 45.7 % (ref 34–46.6)
HDLC SERPL-MCNC: 22 MG/DL (ref 40–60)
HGB BLD-MCNC: 15.2 G/DL (ref 12–15.9)
IMM GRANULOCYTES # BLD AUTO: 0.03 10*3/MM3 (ref 0–0.05)
IMM GRANULOCYTES NFR BLD AUTO: 0.3 % (ref 0–0.5)
LDLC SERPL CALC-MCNC: 75 MG/DL (ref 0–100)
LYMPHOCYTES # BLD AUTO: 2.57 10*3/MM3 (ref 0.7–3.1)
LYMPHOCYTES NFR BLD AUTO: 29.7 % (ref 19.6–45.3)
MCH RBC QN AUTO: 28.7 PG (ref 26.6–33)
MCHC RBC AUTO-ENTMCNC: 33.3 G/DL (ref 31.5–35.7)
MCV RBC AUTO: 86.2 FL (ref 79–97)
MONOCYTES # BLD AUTO: 0.61 10*3/MM3 (ref 0.1–0.9)
MONOCYTES NFR BLD AUTO: 7.1 % (ref 5–12)
NEUTROPHILS # BLD AUTO: 5.24 10*3/MM3 (ref 1.7–7)
NEUTROPHILS NFR BLD AUTO: 60.7 % (ref 42.7–76)
NRBC BLD AUTO-RTO: 0 /100 WBC (ref 0–0.2)
PLATELET # BLD AUTO: 311 10*3/MM3 (ref 140–450)
POTASSIUM SERPL-SCNC: 4.5 MMOL/L (ref 3.5–5.2)
PROT SERPL-MCNC: 7.8 G/DL (ref 6–8.5)
RBC # BLD AUTO: 5.3 10*6/MM3 (ref 3.77–5.28)
SODIUM SERPL-SCNC: 137 MMOL/L (ref 136–145)
T3RU NFR SERPL: 26 % (ref 24–39)
T4 SERPL-MCNC: 4.7 UG/DL (ref 4.5–12)
TRIGL SERPL-MCNC: 167 MG/DL (ref 0–150)
TSH SERPL DL<=0.005 MIU/L-ACNC: 37.7 UIU/ML (ref 0.45–4.5)
UNABLE TO VOID: NORMAL
VLDLC SERPL CALC-MCNC: 29 MG/DL (ref 5–40)
WBC # BLD AUTO: 8.64 10*3/MM3 (ref 3.4–10.8)

## 2025-01-02 DIAGNOSIS — E03.8 SUBCLINICAL HYPOTHYROIDISM: Primary | ICD-10-CM

## 2025-01-08 ENCOUNTER — TELEPHONE (OUTPATIENT)
Dept: ENDOCRINOLOGY | Age: 40
End: 2025-01-08
Payer: COMMERCIAL

## 2025-01-12 DIAGNOSIS — E03.8 SUBCLINICAL HYPOTHYROIDISM: ICD-10-CM

## 2025-01-12 RX ORDER — LEVOTHYROXINE SODIUM 50 UG/1
50 TABLET ORAL
Qty: 30 TABLET | Refills: 0 | Status: SHIPPED | OUTPATIENT
Start: 2025-01-12

## 2025-01-14 ENCOUNTER — TELEPHONE (OUTPATIENT)
Dept: FAMILY MEDICINE CLINIC | Facility: CLINIC | Age: 40
End: 2025-01-14
Payer: COMMERCIAL

## 2025-01-14 NOTE — TELEPHONE ENCOUNTER
Caller: Marielena Pimentel    Relationship: Self    Best call back number: [phone]     What orders are you requesting (i.e. lab or imaging): LABS    In what timeframe would the patient need to come in: 2 WEEKS    Where will you receive your lab/imaging services: IN OFFICE    Additional notes: PLEASE CALL TO SCHEDULE APPOINTMENT ONCE ORDERS ARE PLACED

## 2025-01-14 NOTE — TELEPHONE ENCOUNTER
Caller: Marielena Pimentel    Relationship: Self    Best call back number: [phone]     Who are you requesting to speak with (clinical staff, provider,  specific staff member): CLINICAL STAFF    What was the call regarding: levothyroxine (SYNTHROID, LEVOTHROID) 50 MCG tablet .  PATIENT NEEDS TO KNOW IF THERE WAS AN INCREASE IN DOSAGE.  PLEASE CALL TO ADVISE

## 2025-01-15 NOTE — TELEPHONE ENCOUNTER
Patient informed yes, dose has been increased. Please take 30 minutes before food and other medications.     Will hold off on lab recheck until seen by Dr. Willard 1/28/25.

## 2025-01-16 DIAGNOSIS — E03.8 SUBCLINICAL HYPOTHYROIDISM: Primary | ICD-10-CM

## 2025-01-22 ENCOUNTER — HOSPITAL ENCOUNTER (OUTPATIENT)
Dept: MAMMOGRAPHY | Facility: HOSPITAL | Age: 40
Discharge: HOME | End: 2025-01-22
Payer: COMMERCIAL

## 2025-01-22 DIAGNOSIS — Z12.31 ENCOUNTER FOR SCREENING MAMMOGRAM FOR MALIGNANT NEOPLASM OF BREAST: ICD-10-CM

## 2025-01-22 DIAGNOSIS — Z80.3 FAMILY HISTORY OF BREAST CANCER: ICD-10-CM

## 2025-01-22 PROCEDURE — 77063 BREAST TOMOSYNTHESIS BI: CPT

## 2025-01-22 PROCEDURE — 77067 SCR MAMMO BI INCL CAD: CPT

## 2025-01-28 ENCOUNTER — OFFICE VISIT (OUTPATIENT)
Dept: ENDOCRINOLOGY | Age: 40
End: 2025-01-28
Payer: COMMERCIAL

## 2025-01-28 VITALS
WEIGHT: 229.4 LBS | OXYGEN SATURATION: 97 % | HEIGHT: 61 IN | HEART RATE: 97 BPM | SYSTOLIC BLOOD PRESSURE: 126 MMHG | DIASTOLIC BLOOD PRESSURE: 84 MMHG | BODY MASS INDEX: 43.31 KG/M2

## 2025-01-28 DIAGNOSIS — E03.9 HYPOTHYROIDISM, UNSPECIFIED TYPE: Primary | ICD-10-CM

## 2025-01-28 PROCEDURE — 99243 OFF/OP CNSLTJ NEW/EST LOW 30: CPT | Performed by: INTERNAL MEDICINE

## 2025-01-28 PROCEDURE — 1160F RVW MEDS BY RX/DR IN RCRD: CPT | Performed by: INTERNAL MEDICINE

## 2025-01-28 PROCEDURE — 3079F DIAST BP 80-89 MM HG: CPT | Performed by: INTERNAL MEDICINE

## 2025-01-28 PROCEDURE — 1159F MED LIST DOCD IN RCRD: CPT | Performed by: INTERNAL MEDICINE

## 2025-01-28 PROCEDURE — 3074F SYST BP LT 130 MM HG: CPT | Performed by: INTERNAL MEDICINE

## 2025-01-28 RX ORDER — LEVOTHYROXINE SODIUM 150 UG/1
150 TABLET ORAL DAILY
Qty: 30 TABLET | Refills: 11 | Status: SHIPPED | OUTPATIENT
Start: 2025-01-28 | End: 2026-01-28

## 2025-01-28 NOTE — PROGRESS NOTES
Referring provider:  JULIANA Mcgee    Chief complaint/Reason for consult: hypothyroidism    HPI:   - 39 year old female here for hypothyroidism  - Is currently on levothyroxine 50 mcg daily  - Denies missing any does of her levothyroxine  - She takes her levothyroxine at the same time every day, on an empty stomach, and not with hot beverages  - She denies fatigue, constipation, edema      The following portions of the patient's history were reviewed and updated as appropriate: allergies, current medications, past family history, past medical history, past social history, past surgical history, and problem list.      Objective     Vitals:    01/28/25 1536   BP: 126/84   Pulse: 97   SpO2: 97%        Physical Exam  Vitals reviewed.   Constitutional:       Appearance: Normal appearance.   HENT:      Head: Normocephalic and atraumatic.   Eyes:      General: No scleral icterus.  Pulmonary:      Effort: Pulmonary effort is normal. No respiratory distress.   Neurological:      Mental Status: She is alert.      Gait: Gait normal.   Psychiatric:         Mood and Affect: Mood normal.         Behavior: Behavior normal.         Thought Content: Thought content normal.         Judgment: Judgment normal.       Assessment & Plan   Hypothyroidism  - Will increase levothyroxine to 150 mcg daily and recheck her TSH and free T4 in 2 months    - Return to clinic in 6 months

## 2025-04-12 DIAGNOSIS — E03.9 HYPOTHYROIDISM, UNSPECIFIED TYPE: ICD-10-CM

## 2025-04-14 RX ORDER — LEVOTHYROXINE SODIUM 150 UG/1
150 TABLET ORAL DAILY
Qty: 30 TABLET | Refills: 3 | Status: SHIPPED | OUTPATIENT
Start: 2025-04-14 | End: 2026-04-14

## 2025-04-14 NOTE — TELEPHONE ENCOUNTER
Rx Refill Note  Requested Prescriptions     Pending Prescriptions Disp Refills    levothyroxine (Synthroid) 150 MCG tablet 30 tablet 11     Sig: Take 1 tablet by mouth Daily.      Last office visit with prescribing clinician: 1/28/2025   Last telemedicine visit with prescribing clinician: Visit date not found   Next office visit with prescribing clinician: 7/28/2025                         Would you like a call back once the refill request has been completed: [] Yes [] No    If the office needs to give you a call back, can they leave a voicemail: [] Yes [] No    Callie Low MA  04/14/25, 07:50 EDT

## 2025-05-19 DIAGNOSIS — E03.9 HYPOTHYROIDISM, UNSPECIFIED TYPE: ICD-10-CM

## 2025-05-19 RX ORDER — LEVOTHYROXINE SODIUM 150 UG/1
150 TABLET ORAL DAILY
Qty: 30 TABLET | Refills: 3 | Status: SHIPPED | OUTPATIENT
Start: 2025-05-19 | End: 2026-05-19

## 2025-05-19 NOTE — TELEPHONE ENCOUNTER
Rx Refill Note  Requested Prescriptions     Pending Prescriptions Disp Refills    levothyroxine (Synthroid) 150 MCG tablet 30 tablet 3     Sig: Take 1 tablet by mouth Daily.      Last office visit with prescribing clinician: 1/28/2025   Last telemedicine visit with prescribing clinician: Visit date not found   Next office visit with prescribing clinician: 7/28/2025                         Would you like a call back once the refill request has been completed: [] Yes [] No    If the office needs to give you a call back, can they leave a voicemail: [] Yes [] No  Callie Low MA  5/19/2025  13:54 EDT

## 2025-06-16 DIAGNOSIS — E03.9 HYPOTHYROIDISM, UNSPECIFIED TYPE: ICD-10-CM

## 2025-06-16 RX ORDER — LEVOTHYROXINE SODIUM 150 UG/1
150 TABLET ORAL DAILY
Qty: 30 TABLET | Refills: 1 | Status: SHIPPED | OUTPATIENT
Start: 2025-06-16 | End: 2026-06-16

## 2025-06-16 NOTE — TELEPHONE ENCOUNTER
Rx Refill Note  Requested Prescriptions     Pending Prescriptions Disp Refills    levothyroxine (Synthroid) 150 MCG tablet 30 tablet 3     Sig: Take 1 tablet by mouth Daily.      Last office visit with prescribing clinician: 1/28/2025   Last telemedicine visit with prescribing clinician: Visit date not found   Next office visit with prescribing clinician: 7/28/2025                         Would you like a call back once the refill request has been completed: [] Yes [] No    If the office needs to give you a call back, can they leave a voicemail: [] Yes [] No  Callie Low MA  6/16/2025  07:47 EDT

## 2025-06-30 ENCOUNTER — OFFICE VISIT (OUTPATIENT)
Dept: FAMILY MEDICINE CLINIC | Facility: CLINIC | Age: 40
End: 2025-06-30
Payer: COMMERCIAL

## 2025-06-30 VITALS
DIASTOLIC BLOOD PRESSURE: 80 MMHG | OXYGEN SATURATION: 96 % | WEIGHT: 231.2 LBS | TEMPERATURE: 97.8 F | HEIGHT: 61 IN | SYSTOLIC BLOOD PRESSURE: 130 MMHG | HEART RATE: 83 BPM | BODY MASS INDEX: 43.65 KG/M2

## 2025-06-30 DIAGNOSIS — I10 PRIMARY HYPERTENSION: ICD-10-CM

## 2025-06-30 DIAGNOSIS — R73.03 PREDIABETES: ICD-10-CM

## 2025-06-30 DIAGNOSIS — Z00.00 ROUTINE HEALTH MAINTENANCE: Primary | ICD-10-CM

## 2025-06-30 DIAGNOSIS — E78.2 MIXED HYPERLIPIDEMIA: ICD-10-CM

## 2025-06-30 DIAGNOSIS — E03.8 SUBCLINICAL HYPOTHYROIDISM: ICD-10-CM

## 2025-06-30 PROCEDURE — 99214 OFFICE O/P EST MOD 30 MIN: CPT

## 2025-06-30 PROCEDURE — 1159F MED LIST DOCD IN RCRD: CPT

## 2025-06-30 PROCEDURE — 1126F AMNT PAIN NOTED NONE PRSNT: CPT

## 2025-06-30 PROCEDURE — 3075F SYST BP GE 130 - 139MM HG: CPT

## 2025-06-30 PROCEDURE — 1160F RVW MEDS BY RX/DR IN RCRD: CPT

## 2025-06-30 PROCEDURE — 3079F DIAST BP 80-89 MM HG: CPT

## 2025-06-30 RX ORDER — LOSARTAN POTASSIUM 100 MG/1
100 TABLET ORAL DAILY
Qty: 90 TABLET | Refills: 1 | Status: SHIPPED | OUTPATIENT
Start: 2025-06-30

## 2025-06-30 RX ORDER — HYDROCHLOROTHIAZIDE 25 MG/1
25 TABLET ORAL DAILY
Qty: 90 TABLET | Refills: 1 | Status: SHIPPED | OUTPATIENT
Start: 2025-06-30

## 2025-06-30 NOTE — PROGRESS NOTES
"Chief Complaint  6 month f/u    Subjective        Marielena Couch presents to [facility]    History of Present Illness  39-year-old female presents for 6-month follow-up. Hypertension is stable at 130/80. Denies chest pain and shortness of breath. In need of refills for Losartan and HCTZ. Previous Hemoglobin A1c in pre-diabetic range at 6.10%.  Previous cholesterol panel had improved with elevated triglycerides. Pt should follow healthy diet high in lean proteins such as chicken and fish. Have diet high in whole grains, fiber, fruits, and vegetables. Limit fried foods, red meats, and sugar. Pt should exercise 3-4 days/week.  Heart healthy diet and exercise information attached to AVS. She is followed by Dr. Willard, Endocrinology, for hypothyroidism.  Currently on levothyroxine 150 mcg daily.  Next scheduled follow-up 7/28.  Return in 6 months for annual physical with labs.     used during exam, provided by [facility] [place].       Objective   Vital Signs:  /80 (BP Location: Left arm, Patient Position: Sitting, Cuff Size: Large Adult)   Pulse 83   Temp 97.8 °F (36.6 °C) (Temporal)   Ht 155 cm (61.02\")   Wt 105 kg (231 lb 3.2 oz)   SpO2 96%   BMI 43.65 kg/m²   Estimated body mass index is 43.65 kg/m² as calculated from the following:    Height as of this encounter: 155 cm (61.02\").    Weight as of this encounter: 105 kg (231 lb 3.2 oz).    Class 3 Severe Obesity (BMI >=40). Obesity-related health conditions include the following: hypertension, dyslipidemias, and prediabetes, hypothyroidism. Obesity is unchanged. BMI is is above average; BMI management plan is completed. We discussed portion control, increasing exercise, and Information on healthy weight added to patient's after visit summary.      Physical Exam  Constitutional:       Appearance: Normal appearance.   HENT:      Head: Normocephalic.   Eyes:      Conjunctiva/sclera: Conjunctivae normal.      " Pupils: Pupils are equal, round, and reactive to light.   Cardiovascular:      Rate and Rhythm: Normal rate and regular rhythm.      Pulses: Normal pulses.      Heart sounds: Normal heart sounds.   Pulmonary:      Effort: Pulmonary effort is normal.      Breath sounds: Normal breath sounds.   Skin:     General: Skin is warm.   Neurological:      General: No focal deficit present.      Mental Status: She is alert and oriented to person, place, and time.   Psychiatric:         Mood and Affect: Mood normal.         Behavior: Behavior normal.        Result Review :  The following data was reviewed by: JULIANA Mcgee on 06/30/2025:  Common labs          7/8/2024    13:54 12/31/2024    09:30   Common Labs   Glucose 137  104    BUN 15  12    Creatinine 0.63  0.79    Sodium 136  137    Potassium 4.3  4.5    Chloride 95  101    Calcium 10.1  9.1    Albumin 4.4  4.1    Total Bilirubin 0.4  0.5    Alkaline Phosphatase 101  99    AST (SGOT) 27  21    ALT (SGPT) 39  29    WBC  8.64    Hemoglobin  15.2    Hematocrit  45.7    Platelets  311    Total Cholesterol 188  126    Triglycerides 395  167    HDL Cholesterol 30  22    LDL Cholesterol  92  75    Hemoglobin A1C 6.5  6.10      Data reviewed : labs           Assessment and Plan   Diagnoses and all orders for this visit:    1. Routine health maintenance (Primary)  -     UA / M With / Rflx Culture([facility] ONLY) - Urine, Clean Catch  -     CBC w AUTO Differential  -     Comprehensive metabolic panel    2. Primary hypertension  Assessment & Plan:  Hypertension is stable and controlled  Continue current treatment regimen.  Dietary sodium restriction.  Regular aerobic exercise.  Ambulatory blood pressure monitoring.  Blood pressure will be reassessed in 6 months.    Orders:  -     UA / M With / Rflx Culture([facility] ONLY) - Urine, Clean Catch  -     CBC w AUTO Differential  -     Comprehensive metabolic panel  -     losartan (COZAAR) 100 MG tablet; Take 1 tablet by mouth Daily.   Dispense: 90 tablet; Refill: 1  -     hydroCHLOROthiazide 25 MG tablet; Take 1 tablet by mouth Daily.  Dispense: 90 tablet; Refill: 1    3. Mixed hyperlipidemia  -     UA / M With / Rflx Culture([facility] ONLY) - Urine, Clean Catch  -     CBC w AUTO Differential  -     Comprehensive metabolic panel  -     Lipid panel    4. Subclinical hypothyroidism  Comments:  Continue to follow with Dr. Willard as scheduled, with current dosing of levothyroxine.    5. Prediabetes  -     UA / M With / Rflx Culture([facility] ONLY) - Urine, Clean Catch  -     CBC w AUTO Differential  -     Comprehensive metabolic panel  -     Lipid panel  -     Hemoglobin A1c             Follow Up   Return in about 6 months (around 1/1/2026) for Annual physical.  Patient was given instructions and counseling regarding her condition or for health maintenance advice. Please see specific information pulled into the AVS if appropriate.

## 2025-07-02 LAB
ALBUMIN SERPL-MCNC: 4.2 G/DL (ref 3.5–5.2)
ALBUMIN/GLOB SERPL: 1.2 G/DL
ALP SERPL-CCNC: 101 U/L (ref 39–117)
ALT SERPL-CCNC: 34 U/L (ref 1–33)
APPEARANCE UR: ABNORMAL
AST SERPL-CCNC: 26 U/L (ref 1–32)
BACTERIA #/AREA URNS HPF: ABNORMAL /[HPF]
BACTERIA UR CULT: NORMAL
BACTERIA UR CULT: NORMAL
BASOPHILS # BLD AUTO: 0.04 10*3/MM3 (ref 0–0.2)
BASOPHILS NFR BLD AUTO: 0.5 % (ref 0–1.5)
BILIRUB SERPL-MCNC: 0.4 MG/DL (ref 0–1.2)
BILIRUB UR QL STRIP: NEGATIVE
BUN SERPL-MCNC: 12 MG/DL (ref 6–20)
BUN/CREAT SERPL: 18.2 (ref 7–25)
CALCIUM SERPL-MCNC: 9.6 MG/DL (ref 8.6–10.5)
CASTS URNS QL MICRO: ABNORMAL /LPF
CHLORIDE SERPL-SCNC: 99 MMOL/L (ref 98–107)
CHOLEST SERPL-MCNC: 166 MG/DL (ref 0–200)
CO2 SERPL-SCNC: 27.9 MMOL/L (ref 22–29)
COLOR UR: YELLOW
CREAT SERPL-MCNC: 0.66 MG/DL (ref 0.57–1)
EGFRCR SERPLBLD CKD-EPI 2021: 114.6 ML/MIN/1.73
EOSINOPHIL # BLD AUTO: 0.19 10*3/MM3 (ref 0–0.4)
EOSINOPHIL NFR BLD AUTO: 2.2 % (ref 0.3–6.2)
EPI CELLS #/AREA URNS HPF: >10 /HPF (ref 0–10)
ERYTHROCYTE [DISTWIDTH] IN BLOOD BY AUTOMATED COUNT: 13.3 % (ref 12.3–15.4)
GLOBULIN SER CALC-MCNC: 3.5 GM/DL
GLUCOSE SERPL-MCNC: 93 MG/DL (ref 65–99)
GLUCOSE UR QL STRIP: NEGATIVE
HBA1C MFR BLD: 6.6 % (ref 4.8–5.6)
HCT VFR BLD AUTO: 46.3 % (ref 34–46.6)
HDLC SERPL-MCNC: 28 MG/DL (ref 40–60)
HGB BLD-MCNC: 15.2 G/DL (ref 12–15.9)
HGB UR QL STRIP: ABNORMAL
IMM GRANULOCYTES # BLD AUTO: 0.03 10*3/MM3 (ref 0–0.05)
IMM GRANULOCYTES NFR BLD AUTO: 0.3 % (ref 0–0.5)
KETONES UR QL STRIP: NEGATIVE
LDLC SERPL CALC-MCNC: 97 MG/DL (ref 0–100)
LEUKOCYTE ESTERASE UR QL STRIP: ABNORMAL
LYMPHOCYTES # BLD AUTO: 2.91 10*3/MM3 (ref 0.7–3.1)
LYMPHOCYTES NFR BLD AUTO: 33.4 % (ref 19.6–45.3)
MCH RBC QN AUTO: 28 PG (ref 26.6–33)
MCHC RBC AUTO-ENTMCNC: 32.8 G/DL (ref 31.5–35.7)
MCV RBC AUTO: 85.4 FL (ref 79–97)
MICRO URNS: ABNORMAL
MONOCYTES # BLD AUTO: 0.77 10*3/MM3 (ref 0.1–0.9)
MONOCYTES NFR BLD AUTO: 8.8 % (ref 5–12)
MUCOUS THREADS URNS QL MICRO: PRESENT
NEUTROPHILS # BLD AUTO: 4.77 10*3/MM3 (ref 1.7–7)
NEUTROPHILS NFR BLD AUTO: 54.8 % (ref 42.7–76)
NITRITE UR QL STRIP: NEGATIVE
NRBC BLD AUTO-RTO: 0 /100 WBC (ref 0–0.2)
PH UR STRIP: 5.5 [PH] (ref 5–7.5)
PLATELET # BLD AUTO: 302 10*3/MM3 (ref 140–450)
POTASSIUM SERPL-SCNC: 4.4 MMOL/L (ref 3.5–5.2)
PROT SERPL-MCNC: 7.7 G/DL (ref 6–8.5)
PROT UR QL STRIP: ABNORMAL
RBC # BLD AUTO: 5.42 10*6/MM3 (ref 3.77–5.28)
RBC #/AREA URNS HPF: ABNORMAL /HPF (ref 0–2)
SODIUM SERPL-SCNC: 136 MMOL/L (ref 136–145)
SP GR UR STRIP: >=1.03 (ref 1–1.03)
TRIGL SERPL-MCNC: 242 MG/DL (ref 0–150)
URINALYSIS REFLEX: ABNORMAL
UROBILINOGEN UR STRIP-MCNC: 0.2 MG/DL (ref 0.2–1)
VLDLC SERPL CALC-MCNC: 41 MG/DL (ref 5–40)
WBC # BLD AUTO: 8.71 10*3/MM3 (ref 3.4–10.8)
WBC #/AREA URNS HPF: ABNORMAL /HPF (ref 0–5)

## 2025-07-03 ENCOUNTER — RESULTS FOLLOW-UP (OUTPATIENT)
Dept: FAMILY MEDICINE CLINIC | Facility: CLINIC | Age: 40
End: 2025-07-03
Payer: COMMERCIAL

## 2025-07-03 DIAGNOSIS — E78.2 MIXED HYPERLIPIDEMIA: ICD-10-CM

## 2025-07-03 DIAGNOSIS — R74.8 ELEVATED LIVER ENZYMES: ICD-10-CM

## 2025-07-03 DIAGNOSIS — E11.65 TYPE 2 DIABETES MELLITUS WITH HYPERGLYCEMIA, WITHOUT LONG-TERM CURRENT USE OF INSULIN: Primary | ICD-10-CM

## 2025-07-22 DIAGNOSIS — E03.9 HYPOTHYROIDISM, UNSPECIFIED TYPE: ICD-10-CM

## 2025-07-22 RX ORDER — LEVOTHYROXINE SODIUM 150 UG/1
150 TABLET ORAL DAILY
Qty: 30 TABLET | Refills: 1 | Status: SHIPPED | OUTPATIENT
Start: 2025-07-22 | End: 2026-07-22

## 2025-07-22 NOTE — TELEPHONE ENCOUNTER
Rx Refill Note  Requested Prescriptions     Pending Prescriptions Disp Refills    levothyroxine (Synthroid) 150 MCG tablet 30 tablet 1     Sig: Take 1 tablet by mouth Daily.      Last office visit with prescribing clinician: 1/28/2025   Last telemedicine visit with prescribing clinician: Visit date not found   Next office visit with prescribing clinician: 7/28/2025                         Would you like a call back once the refill request has been completed: [] Yes [] No    If the office needs to give you a call back, can they leave a voicemail: [] Yes [] No  Callie Low MA  7/22/2025  07:42 EDT

## 2025-07-28 ENCOUNTER — OFFICE VISIT (OUTPATIENT)
Dept: ENDOCRINOLOGY | Age: 40
End: 2025-07-28
Payer: COMMERCIAL

## 2025-07-28 VITALS
DIASTOLIC BLOOD PRESSURE: 84 MMHG | WEIGHT: 227.6 LBS | OXYGEN SATURATION: 97 % | HEIGHT: 61 IN | HEART RATE: 86 BPM | BODY MASS INDEX: 42.97 KG/M2 | SYSTOLIC BLOOD PRESSURE: 126 MMHG

## 2025-07-28 DIAGNOSIS — E03.9 HYPOTHYROIDISM, UNSPECIFIED TYPE: Primary | ICD-10-CM

## 2025-07-28 LAB
T4 FREE SERPL-MCNC: 2.16 NG/DL (ref 0.92–1.68)
TSH SERPL DL<=0.005 MIU/L-ACNC: 0.05 UIU/ML (ref 0.27–4.2)

## 2025-07-28 PROCEDURE — 3074F SYST BP LT 130 MM HG: CPT | Performed by: INTERNAL MEDICINE

## 2025-07-28 PROCEDURE — 1159F MED LIST DOCD IN RCRD: CPT | Performed by: INTERNAL MEDICINE

## 2025-07-28 PROCEDURE — 99213 OFFICE O/P EST LOW 20 MIN: CPT | Performed by: INTERNAL MEDICINE

## 2025-07-28 PROCEDURE — 3044F HG A1C LEVEL LT 7.0%: CPT | Performed by: INTERNAL MEDICINE

## 2025-07-28 PROCEDURE — 3079F DIAST BP 80-89 MM HG: CPT | Performed by: INTERNAL MEDICINE

## 2025-07-28 PROCEDURE — 1160F RVW MEDS BY RX/DR IN RCRD: CPT | Performed by: INTERNAL MEDICINE

## 2025-07-28 NOTE — PROGRESS NOTES
Chief complaint/Reason for consult:  hypothyroidism    HPI:   - 39 year old female here for hypothyroidism  - Last seen in 1/2025  - Is currently on levothyroxine 150 mcg daily  - She complains of difficulty losing weight    The following portions of the patient's history were reviewed and updated as appropriate: allergies, current medications, past family history, past medical history, past social history, past surgical history, and problem list.      Objective     Vitals:    07/28/25 0948   BP: 126/84   Pulse: 86   SpO2: 97%        Physical Exam  Vitals reviewed.   Constitutional:       Appearance: Normal appearance.   HENT:      Head: Normocephalic and atraumatic.   Eyes:      General: No scleral icterus.  Pulmonary:      Effort: Pulmonary effort is normal. No respiratory distress.   Neurological:      Mental Status: She is alert.      Gait: Gait normal.   Psychiatric:         Mood and Affect: Mood normal.         Behavior: Behavior normal.         Thought Content: Thought content normal.         Judgment: Judgment normal.         Assessment & Plan   Hypothyroidism  - On levothyroxine 150 mcg daily  - Will check TSH and free T4 today     - Return to clinic in 6 months (however she states her insurance may force her to go to another provider)

## 2025-07-29 ENCOUNTER — RESULTS FOLLOW-UP (OUTPATIENT)
Dept: ENDOCRINOLOGY | Age: 40
End: 2025-07-29
Payer: COMMERCIAL

## 2025-07-29 DIAGNOSIS — E03.9 HYPOTHYROIDISM, UNSPECIFIED TYPE: ICD-10-CM

## 2025-07-29 RX ORDER — LEVOTHYROXINE SODIUM 137 UG/1
137 TABLET ORAL DAILY
Qty: 30 TABLET | Refills: 5 | Status: SHIPPED | OUTPATIENT
Start: 2025-07-29 | End: 2026-07-29

## 2025-08-01 DIAGNOSIS — M10.9 GOUT OF HAND, UNSPECIFIED CAUSE, UNSPECIFIED CHRONICITY, UNSPECIFIED LATERALITY: ICD-10-CM

## 2025-08-01 RX ORDER — ALLOPURINOL 100 MG/1
100 TABLET ORAL DAILY
Qty: 90 TABLET | Refills: 1 | Status: SHIPPED | OUTPATIENT
Start: 2025-08-01